# Patient Record
Sex: MALE | Race: WHITE
[De-identification: names, ages, dates, MRNs, and addresses within clinical notes are randomized per-mention and may not be internally consistent; named-entity substitution may affect disease eponyms.]

---

## 2020-03-02 ENCOUNTER — HEALTH MAINTENANCE LETTER (OUTPATIENT)
Age: 40
End: 2020-03-02

## 2025-07-23 ENCOUNTER — MEDICAL CORRESPONDENCE (OUTPATIENT)
Dept: HEALTH INFORMATION MANAGEMENT | Facility: CLINIC | Age: 45
End: 2025-07-23
Payer: COMMERCIAL

## 2025-07-24 ENCOUNTER — TRANSCRIBE ORDERS (OUTPATIENT)
Dept: OTHER | Age: 45
End: 2025-07-24

## 2025-07-24 DIAGNOSIS — D16.01 ENCHONDROMA OF RIGHT HUMERUS: Primary | ICD-10-CM

## 2025-07-28 NOTE — TELEPHONE ENCOUNTER
Action July 28, 2025 2:51 PM MT   Action Taken Sent a request for imaging from [facility].       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   MRI PACS [facility]:  07/15/2025, 03/18/2013 - [facility]   XRAYS (IMAGES & REPORTS) PACS [facility]:  03/29/2024 - [facility] Should[facility]    Internal:  03/29/2013 - RT Shoulder

## 2025-07-29 ENCOUNTER — VIRTUAL VISIT (OUTPATIENT)
Dept: ORTHOPEDICS | Facility: CLINIC | Age: 45
End: 2025-07-29
Payer: COMMERCIAL

## 2025-07-29 ENCOUNTER — PRE VISIT (OUTPATIENT)
Dept: ORTHOPEDICS | Facility: CLINIC | Age: 45
End: 2025-07-29

## 2025-07-29 VITALS — BODY MASS INDEX: 37.86 KG/M2 | WEIGHT: 295 LBS | HEIGHT: 74 IN

## 2025-07-29 DIAGNOSIS — D16.01 ENCHONDROMA OF RIGHT HUMERUS: ICD-10-CM

## 2025-07-29 PROCEDURE — 98001 SYNCH AUDIO-VIDEO NEW LOW 30: CPT | Performed by: ORTHOPAEDIC SURGERY

## 2025-07-29 NOTE — PATIENT INSTRUCTIONS
Impression: Follow-up of incidental finding of an enchondroma in the right humerus.  Has grown a small amount, 2 to 3 mm, over the past 12 years.    Plan: Follow-up in 3 years with an x-ray AP and lateral of the right humerus.  MRI scan is not necessary.  He would like the x-ray done and [place] and we can follow him virtually.

## 2025-07-29 NOTE — PROGRESS NOTES
Impression: Follow-up of incidental finding of an enchondroma in the right humerus.  Has grown a small amount, 2 to 3 mm, over the past 12 years.    Plan: Follow-up in 3 years with an x-ray AP and lateral of the right humerus.  MRI scan is not necessary.  He would like the x-ray done and [place] and we can follow him virtually.    Diagnosis: Incidental finding of an enchondroma in the right proximal humerus    Patient is seen virtually as further evaluation of his rotator cuff at home and new home confirmed the presence of the enchondroma in the right proximal humerus and was reported to show some enlargement.  He is here to discuss that interpretation and any next steps.    He still having rotator cuff symptoms of the right shoulder but not necessarily new symptoms.    I reviewed the MRI scans and x-rays from [facility] and compare the MRI to the scan performed recently.  To my measure in 1 dimension there is no change in the lesion measuring 27 mm proximal to distal and in the axial dimension there was some enlargement from 18 to 20 mm.    I reported the patient this does not constitute significant growth.  Erosion or scalloping.    His primary clinic and the radiologist are recommending follow-up we discussed that follow-up per their request will see him in 3 years with a plain x-ray follow-up    This virtual visit began at 3:17 PM and ended at 3:28 PM.  The total length of the visit was 11 minutes.

## 2025-07-29 NOTE — NURSING NOTE
Current patient location: [address] [place] [id number]    Is the patient currently in the state of [place]? YES    Visit mode: VIDEO    If the visit is dropped, the patient can be reconnected by:VIDEO VISIT: Send to e-mail at: [email or web address]    Will anyone else be joining the visit? NO  (If patient encounters technical issues they should call [phone]    Are changes needed to the allergy or medication list? No    Are refills needed on medications prescribed by this physician? NO    Rooming Documentation:  Questionnaire(s) completed    Reason for visit: Consult    Dahlia Damon VVF  No vitals

## 2025-07-29 NOTE — LETTER
7/29/2025      Joshua Méndez  [address] [place] [id number]      Dear Colleague,    Thank you for referring your patient, Joshua Méndez, to the [facility] [place]. Please see a copy of my visit note below.    Impression: Follow-up of incidental finding of an enchondroma in the right humerus.  Has grown a small amount, 2 to 3 mm, over the past 12 years.    Plan: Follow-up in 3 years with an x-ray AP and lateral of the right humerus.  MRI scan is not necessary.  He would like the x-ray done and [place] and we can follow him virtually.    Diagnosis: Incidental finding of an enchondroma in the right proximal humerus    Patient is seen virtually as further evaluation of his rotator cuff at home and new home confirmed the presence of the enchondroma in the right proximal humerus and was reported to show some enlargement.  He is here to discuss that interpretation and any next steps.    He still having rotator cuff symptoms of the right shoulder but not necessarily new symptoms.    I reviewed the MRI scans and x-rays from [facility] and compare the MRI to the scan performed recently.  To my measure in 1 dimension there is no change in the lesion measuring 27 mm proximal to distal and in the axial dimension there was some enlargement from 18 to 20 mm.    I reported the patient this does not constitute significant growth.  Erosion or scalloping.    His primary clinic and the radiologist are recommending follow-up we discussed that follow-up per their request will see him in 3 years with a plain x-ray follow-up    This virtual visit began at 3:17 PM and ended at 3:28 PM.  The total length of the visit was 11 minutes.    Again, thank you for allowing me to participate in the care of your patient.        Sincerely,        Renan Cm MD    Electronically signed